# Patient Record
Sex: FEMALE | Race: WHITE | NOT HISPANIC OR LATINO
[De-identification: names, ages, dates, MRNs, and addresses within clinical notes are randomized per-mention and may not be internally consistent; named-entity substitution may affect disease eponyms.]

---

## 2018-07-16 ENCOUNTER — RESULT REVIEW (OUTPATIENT)
Age: 28
End: 2018-07-16

## 2019-06-18 ENCOUNTER — FORM ENCOUNTER (OUTPATIENT)
Age: 29
End: 2019-06-18

## 2019-06-19 ENCOUNTER — APPOINTMENT (OUTPATIENT)
Dept: RHEUMATOLOGY | Facility: CLINIC | Age: 29
End: 2019-06-19
Payer: COMMERCIAL

## 2019-06-19 VITALS
HEART RATE: 80 BPM | WEIGHT: 121 LBS | BODY MASS INDEX: 20.16 KG/M2 | OXYGEN SATURATION: 97 % | TEMPERATURE: 99.8 F | DIASTOLIC BLOOD PRESSURE: 88 MMHG | HEIGHT: 65 IN | SYSTOLIC BLOOD PRESSURE: 127 MMHG | RESPIRATION RATE: 16 BRPM

## 2019-06-19 DIAGNOSIS — Z83.3 FAMILY HISTORY OF DIABETES MELLITUS: ICD-10-CM

## 2019-06-19 DIAGNOSIS — Z83.49 FAMILY HISTORY OF OTHER ENDOCRINE, NUTRITIONAL AND METABOLIC DISEASES: ICD-10-CM

## 2019-06-19 DIAGNOSIS — M25.562 PAIN IN RIGHT KNEE: ICD-10-CM

## 2019-06-19 DIAGNOSIS — M21.41 FLAT FOOT [PES PLANUS] (ACQUIRED), RIGHT FOOT: ICD-10-CM

## 2019-06-19 DIAGNOSIS — M21.42 FLAT FOOT [PES PLANUS] (ACQUIRED), RIGHT FOOT: ICD-10-CM

## 2019-06-19 DIAGNOSIS — Z82.62 FAMILY HISTORY OF OSTEOPOROSIS: ICD-10-CM

## 2019-06-19 DIAGNOSIS — M25.561 PAIN IN RIGHT KNEE: ICD-10-CM

## 2019-06-19 DIAGNOSIS — Z87.09 PERSONAL HISTORY OF OTHER DISEASES OF THE RESPIRATORY SYSTEM: ICD-10-CM

## 2019-06-19 PROCEDURE — 99203 OFFICE O/P NEW LOW 30 MIN: CPT

## 2019-06-19 PROCEDURE — 73562 X-RAY EXAM OF KNEE 3: CPT | Mod: RT

## 2019-06-19 RX ORDER — NORETHINDRONE ACETATE AND ETHINYL ESTRADIOL 1; 20 MG/1; UG/1
1-20 TABLET ORAL DAILY
Refills: 0 | Status: ACTIVE | COMMUNITY
Start: 2019-06-19

## 2019-06-25 LAB
25(OH)D3 SERPL-MCNC: 35.1 NG/ML
ALBUMIN SERPL ELPH-MCNC: 4.6 G/DL
ALP BLD-CCNC: 46 U/L
ALT SERPL-CCNC: 13 U/L
ANA PAT FLD IF-IMP: ABNORMAL
ANA SER IF-ACNC: ABNORMAL
ANION GAP SERPL CALC-SCNC: 14 MMOL/L
APPEARANCE: CLEAR
AST SERPL-CCNC: 16 U/L
BACTERIA: NEGATIVE
BASOPHILS # BLD AUTO: 0.04 K/UL
BASOPHILS NFR BLD AUTO: 0.5 %
BILIRUB SERPL-MCNC: 0.5 MG/DL
BILIRUBIN URINE: NEGATIVE
BLOOD URINE: NEGATIVE
BUN SERPL-MCNC: 12 MG/DL
CA-I SERPL-SCNC: 1.2 MMOL/L
CALCIUM SERPL-MCNC: 9.5 MG/DL
CHLORIDE SERPL-SCNC: 103 MMOL/L
CO2 SERPL-SCNC: 22 MMOL/L
COLOR: NORMAL
CREAT SERPL-MCNC: 0.75 MG/DL
CREAT SPEC-SCNC: 47 MG/DL
CREAT/PROT UR: 0.1 RATIO
EOSINOPHIL # BLD AUTO: 0.03 K/UL
EOSINOPHIL NFR BLD AUTO: 0.3 %
GLUCOSE QUALITATIVE U: NEGATIVE
GLUCOSE SERPL-MCNC: 90 MG/DL
HCT VFR BLD CALC: 39.2 %
HGB BLD-MCNC: 13 G/DL
HYALINE CASTS: 0 /LPF
IMM GRANULOCYTES NFR BLD AUTO: 0.3 %
KETONES URINE: NEGATIVE
LEUKOCYTE ESTERASE URINE: NEGATIVE
LYMPHOCYTES # BLD AUTO: 3.52 K/UL
LYMPHOCYTES NFR BLD AUTO: 40.5 %
MAN DIFF?: NORMAL
MCHC RBC-ENTMCNC: 30.6 PG
MCHC RBC-ENTMCNC: 33.2 GM/DL
MCV RBC AUTO: 92.2 FL
MICROSCOPIC-UA: NORMAL
MONOCYTES # BLD AUTO: 0.74 K/UL
MONOCYTES NFR BLD AUTO: 8.5 %
NEUTROPHILS # BLD AUTO: 4.34 K/UL
NEUTROPHILS NFR BLD AUTO: 49.9 %
NITRITE URINE: NEGATIVE
PH URINE: 7.5
PHOSPHATE SERPL-MCNC: 3.3 MG/DL
PLATELET # BLD AUTO: 273 K/UL
POTASSIUM SERPL-SCNC: 4.2 MMOL/L
PROT SERPL-MCNC: 7.2 G/DL
PROT UR-MCNC: 4 MG/DL
PROTEIN URINE: NEGATIVE
RBC # BLD: 4.25 M/UL
RBC # FLD: 12.2 %
RED BLOOD CELLS URINE: 2 /HPF
SODIUM SERPL-SCNC: 139 MMOL/L
SPECIFIC GRAVITY URINE: 1.01
SQUAMOUS EPITHELIAL CELLS: 0 /HPF
T4 FREE SERPL-MCNC: 1.3 NG/DL
TSH SERPL-ACNC: 2.18 UIU/ML
UROBILINOGEN URINE: NORMAL
WBC # FLD AUTO: 8.7 K/UL
WHITE BLOOD CELLS URINE: 0 /HPF

## 2019-08-20 ENCOUNTER — RESULT REVIEW (OUTPATIENT)
Age: 29
End: 2019-08-20

## 2020-04-26 ENCOUNTER — MESSAGE (OUTPATIENT)
Age: 30
End: 2020-04-26

## 2020-10-20 ENCOUNTER — RESULT REVIEW (OUTPATIENT)
Age: 30
End: 2020-10-20

## 2022-03-29 ENCOUNTER — APPOINTMENT (OUTPATIENT)
Dept: OBGYN | Facility: CLINIC | Age: 32
End: 2022-03-29
Payer: COMMERCIAL

## 2022-03-29 ENCOUNTER — RESULT REVIEW (OUTPATIENT)
Age: 32
End: 2022-03-29

## 2022-03-29 PROCEDURE — 81002 URINALYSIS NONAUTO W/O SCOPE: CPT

## 2022-03-29 PROCEDURE — 76856 US EXAM PELVIC COMPLETE: CPT | Mod: 59

## 2022-03-29 PROCEDURE — 99000 SPECIMEN HANDLING OFFICE-LAB: CPT

## 2022-03-29 PROCEDURE — 76830 TRANSVAGINAL US NON-OB: CPT

## 2022-03-29 PROCEDURE — 99395 PREV VISIT EST AGE 18-39: CPT | Mod: 25

## 2022-08-16 ENCOUNTER — APPOINTMENT (OUTPATIENT)
Dept: ENDOCRINOLOGY | Facility: CLINIC | Age: 32
End: 2022-08-16

## 2022-08-16 ENCOUNTER — NON-APPOINTMENT (OUTPATIENT)
Age: 32
End: 2022-08-16

## 2022-08-16 ENCOUNTER — LABORATORY RESULT (OUTPATIENT)
Age: 32
End: 2022-08-16

## 2022-08-16 VITALS
DIASTOLIC BLOOD PRESSURE: 84 MMHG | HEIGHT: 65 IN | BODY MASS INDEX: 21.04 KG/M2 | HEART RATE: 78 BPM | SYSTOLIC BLOOD PRESSURE: 135 MMHG | WEIGHT: 126.25 LBS | OXYGEN SATURATION: 97 %

## 2022-08-16 DIAGNOSIS — Z82.5 FAMILY HISTORY OF ASTHMA AND OTHER CHRONIC LOWER RESPIRATORY DISEASES: ICD-10-CM

## 2022-08-16 DIAGNOSIS — R25.1 TREMOR, UNSPECIFIED: ICD-10-CM

## 2022-08-16 DIAGNOSIS — Z83.3 FAMILY HISTORY OF DIABETES MELLITUS: ICD-10-CM

## 2022-08-16 DIAGNOSIS — M17.12 UNILATERAL PRIMARY OSTEOARTHRITIS, LEFT KNEE: ICD-10-CM

## 2022-08-16 DIAGNOSIS — Z82.62 FAMILY HISTORY OF OSTEOPOROSIS: ICD-10-CM

## 2022-08-16 DIAGNOSIS — Z83.49 FAMILY HISTORY OF OTHER ENDOCRINE, NUTRITIONAL AND METABOLIC DISEASES: ICD-10-CM

## 2022-08-16 PROCEDURE — 36415 COLL VENOUS BLD VENIPUNCTURE: CPT

## 2022-08-16 PROCEDURE — 99205 OFFICE O/P NEW HI 60 MIN: CPT | Mod: 25

## 2022-08-16 RX ORDER — NORETHINDRONE ACETATE AND ETHINYL ESTRADIOL AND FERROUS FUMARATE 1MG-20(21)
1-20 KIT ORAL
Qty: 28 | Refills: 0 | Status: ACTIVE | COMMUNITY
Start: 2022-03-29

## 2022-08-16 NOTE — PAST MEDICAL HISTORY
[Menstruating] : The patient is menstruating [Menarche Age ____] : age at menarche was [unfilled] [Definite ___ (Date)] : the last menstrual period was [unfilled] [Regular Cycle Intervals] : have been regular [Total Preg ___] : G[unfilled]

## 2022-08-16 NOTE — CONSULT LETTER
[Dear  ___] : Dear  [unfilled], [( Thank you for referring [unfilled] for consultation for _____ )] : Thank you for referring [unfilled] for consultation for [unfilled] [Please see my note below.] : Please see my note below. [Consult Closing:] : Thank you very much for allowing me to participate in the care of this patient.  If you have any questions, please do not hesitate to contact me. [FreeTextEntry3] : Chelsea Pérez MD\par

## 2022-08-16 NOTE — REVIEW OF SYSTEMS
[Palpitations] : palpitations [Nausea] : nausea [Heartburn] : heartburn [Gas/Bloating] : gas/bloating [Polyuria] : polyuria [Nocturia] : nocturia [Joint Pain] : joint pain [Tremors] : tremors [Depression] : depression [Negative] : Heme/Lymph [All other systems negative] : All other systems negative [Recent Weight Gain (___ Lbs)] : no recent weight gain [Recent Weight Loss (___ Lbs)] : no recent weight loss [Insomnia] : no insomnia [FreeTextEntry3] : contacts  [FreeTextEntry6] : h/o asthma [FreeTextEntry9] : knee pain and swelling , some back pain  [de-identified] : no meds  [de-identified] : thyroid nodule 6-7 years

## 2022-08-16 NOTE — HISTORY OF PRESENT ILLNESS
[FreeTextEntry1] : Patient is a 30 y/o female Here for evaluation of an  enlarged thyroid. This was found at the age of 25 and remembers having a number of ultrasounds and a biopsy was negative. She does not have these records. Has not been followed up on for a number of years. She has no local neck discomfort. Her weight has been stable and menses  have been regular but is currently on birth control. She has noticed an increase in tremors and some difficulty falling asleep.  Has not had recent labs done. She did fracture her foot a number of years ago and a bone density showed osteopenia. Her mom does have osteoporosis end Hashimoto's.

## 2022-08-16 NOTE — REASON FOR VISIT
[Consultation] : a consultation visit [Thyroid nodule/ MNG] : thyroid nodule/ MNG [FreeTextEntry2] : Dr Gerry Villalobos

## 2022-08-16 NOTE — ASSESSMENT
[FreeTextEntry1] : Patient with a nodular goiter. Will check thyroid levels today clinically suspicious of hyperthyroidism. She will do thyroid ultrasound and try to retrieve her old records for comparison. She will try to obtain a copy of her bone density. Given that she was told she had a low bone density and a history of her fracture will rule out celiac since she does express GI symptoms.\par Call to discuss results and further plans.

## 2022-08-17 LAB
25(OH)D3 SERPL-MCNC: 34.6 NG/ML
ALBUMIN SERPL ELPH-MCNC: 4.6 G/DL
ALP BLD-CCNC: 49 U/L
ALT SERPL-CCNC: 14 U/L
ANION GAP SERPL CALC-SCNC: 11 MMOL/L
AST SERPL-CCNC: 18 U/L
BASOPHILS # BLD AUTO: 0.03 K/UL
BASOPHILS NFR BLD AUTO: 0.5 %
BILIRUB SERPL-MCNC: 0.5 MG/DL
BUN SERPL-MCNC: 11 MG/DL
CALCIUM SERPL-MCNC: 9.2 MG/DL
CHLORIDE SERPL-SCNC: 105 MMOL/L
CHOLEST SERPL-MCNC: 183 MG/DL
CO2 SERPL-SCNC: 23 MMOL/L
CREAT SERPL-MCNC: 0.7 MG/DL
EGFR: 119 ML/MIN/1.73M2
EOSINOPHIL # BLD AUTO: 0.07 K/UL
EOSINOPHIL NFR BLD AUTO: 1.1 %
GLUCOSE SERPL-MCNC: 90 MG/DL
HCT VFR BLD CALC: 41.6 %
HDLC SERPL-MCNC: 61 MG/DL
HGB BLD-MCNC: 13.5 G/DL
IMM GRANULOCYTES NFR BLD AUTO: 0.3 %
LDLC SERPL CALC-MCNC: 100 MG/DL
LYMPHOCYTES # BLD AUTO: 2.55 K/UL
LYMPHOCYTES NFR BLD AUTO: 40.7 %
MAN DIFF?: NORMAL
MCHC RBC-ENTMCNC: 30.9 PG
MCHC RBC-ENTMCNC: 32.5 GM/DL
MCV RBC AUTO: 95.2 FL
MONOCYTES # BLD AUTO: 0.45 K/UL
MONOCYTES NFR BLD AUTO: 7.2 %
NEUTROPHILS # BLD AUTO: 3.14 K/UL
NEUTROPHILS NFR BLD AUTO: 50.2 %
NONHDLC SERPL-MCNC: 122 MG/DL
PLATELET # BLD AUTO: 291 K/UL
POTASSIUM SERPL-SCNC: 4.3 MMOL/L
PROT SERPL-MCNC: 7.2 G/DL
RBC # BLD: 4.37 M/UL
RBC # FLD: 12.5 %
SODIUM SERPL-SCNC: 138 MMOL/L
T3 SERPL-MCNC: 126 NG/DL
T4 FREE SERPL-MCNC: 1.2 NG/DL
T4 SERPL-MCNC: 7.6 UG/DL
TRIGL SERPL-MCNC: 112 MG/DL
TSH SERPL-ACNC: 2.21 UIU/ML
WBC # FLD AUTO: 6.26 K/UL

## 2022-08-18 ENCOUNTER — APPOINTMENT (OUTPATIENT)
Dept: ULTRASOUND IMAGING | Facility: CLINIC | Age: 32
End: 2022-08-18

## 2022-08-18 ENCOUNTER — OUTPATIENT (OUTPATIENT)
Dept: OUTPATIENT SERVICES | Facility: HOSPITAL | Age: 32
LOS: 1 days | End: 2022-08-18
Payer: COMMERCIAL

## 2022-08-18 DIAGNOSIS — E04.1 NONTOXIC SINGLE THYROID NODULE: ICD-10-CM

## 2022-08-18 PROCEDURE — 76536 US EXAM OF HEAD AND NECK: CPT | Mod: 26

## 2022-08-18 PROCEDURE — 76536 US EXAM OF HEAD AND NECK: CPT

## 2022-08-19 ENCOUNTER — TRANSCRIPTION ENCOUNTER (OUTPATIENT)
Age: 32
End: 2022-08-19

## 2022-10-25 ENCOUNTER — APPOINTMENT (OUTPATIENT)
Dept: OBGYN | Facility: CLINIC | Age: 32
End: 2022-10-25

## 2022-10-25 PROCEDURE — 76830 TRANSVAGINAL US NON-OB: CPT

## 2022-10-25 PROCEDURE — 99213 OFFICE O/P EST LOW 20 MIN: CPT | Mod: 25

## 2022-11-02 LAB
CELIACPAN: NORMAL
THYROGLOB AB SERPL-ACNC: <20 IU/ML
THYROPEROXIDASE AB SERPL IA-ACNC: 175 IU/ML
TSH RECEPTOR AB: <1.1 IU/L
TSI ACT/NOR SER: <0.1 IU/L

## 2023-04-18 ENCOUNTER — APPOINTMENT (OUTPATIENT)
Dept: OBGYN | Facility: CLINIC | Age: 33
End: 2023-04-18
Payer: COMMERCIAL

## 2023-04-18 PROCEDURE — 99395 PREV VISIT EST AGE 18-39: CPT

## 2023-04-18 PROCEDURE — 81002 URINALYSIS NONAUTO W/O SCOPE: CPT

## 2023-08-03 ENCOUNTER — APPOINTMENT (OUTPATIENT)
Dept: INTERNAL MEDICINE | Facility: CLINIC | Age: 33
End: 2023-08-03

## 2023-08-08 ENCOUNTER — APPOINTMENT (OUTPATIENT)
Dept: INTERNAL MEDICINE | Facility: CLINIC | Age: 33
End: 2023-08-08

## 2023-09-20 ENCOUNTER — APPOINTMENT (OUTPATIENT)
Dept: INTERNAL MEDICINE | Facility: CLINIC | Age: 33
End: 2023-09-20

## 2023-09-20 ENCOUNTER — APPOINTMENT (OUTPATIENT)
Dept: DERMATOLOGY | Facility: CLINIC | Age: 33
End: 2023-09-20

## 2023-12-12 ENCOUNTER — APPOINTMENT (OUTPATIENT)
Dept: OBGYN | Facility: CLINIC | Age: 33
End: 2023-12-12
Payer: COMMERCIAL

## 2023-12-12 PROCEDURE — 99213 OFFICE O/P EST LOW 20 MIN: CPT

## 2023-12-12 PROCEDURE — 76830 TRANSVAGINAL US NON-OB: CPT

## 2024-02-13 ENCOUNTER — NON-APPOINTMENT (OUTPATIENT)
Age: 34
End: 2024-02-13

## 2024-02-14 ENCOUNTER — APPOINTMENT (OUTPATIENT)
Dept: DERMATOLOGY | Facility: CLINIC | Age: 34
End: 2024-02-14
Payer: COMMERCIAL

## 2024-02-14 ENCOUNTER — LABORATORY RESULT (OUTPATIENT)
Age: 34
End: 2024-02-14

## 2024-02-14 DIAGNOSIS — L50.3 DERMATOGRAPHIC URTICARIA: ICD-10-CM

## 2024-02-14 DIAGNOSIS — L30.9 DERMATITIS, UNSPECIFIED: ICD-10-CM

## 2024-02-14 PROCEDURE — 99203 OFFICE O/P NEW LOW 30 MIN: CPT

## 2024-02-14 RX ORDER — TRIAMCINOLONE ACETONIDE 1 MG/G
0.1 OINTMENT TOPICAL
Qty: 1 | Refills: 0 | Status: ACTIVE | COMMUNITY
Start: 2024-02-14 | End: 1900-01-01

## 2024-02-14 NOTE — PHYSICAL EXAM
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

## 2024-02-14 NOTE — ASSESSMENT
[FreeTextEntry1] : #favor arthropod assault, R forearm ok to complete the course of abx given surrounding zone of erythema will check lyme serology though EM less likely start triamcinolone 0.1% ointment BID to AA on body PRN pruritus, SED recommend having home evaluated for bed bugs  #dermatographism discussed nature and course of condition, including treatment options risks/benefits/alternatives involved start claritin 10 mg daily , SED including drowsiness  RTC 2 mo

## 2024-02-14 NOTE — HISTORY OF PRESENT ILLNESS
[FreeTextEntry1] : np bump [de-identified] : Referred by: none   Ms. CATRACHITO GARRISON  is a 33 year old F here for evaluation of below  #insect bites on R arm on 2/11, worsened w/ swelling the following 2 days . UC started amox 2 days ago w/ improvement has been happening a few times over the last few months, about once a week no other affected household contacts no travel  no hiking   #also itchy on chest over the last few weeks   no personal or family h/o skin cancer psychologist at [facility]

## 2024-03-22 ENCOUNTER — LABORATORY RESULT (OUTPATIENT)
Age: 34
End: 2024-03-22

## 2024-03-22 ENCOUNTER — NON-APPOINTMENT (OUTPATIENT)
Age: 34
End: 2024-03-22

## 2024-03-22 ENCOUNTER — APPOINTMENT (OUTPATIENT)
Dept: INTERNAL MEDICINE | Facility: CLINIC | Age: 34
End: 2024-03-22
Payer: COMMERCIAL

## 2024-03-22 VITALS
WEIGHT: 121 LBS | OXYGEN SATURATION: 99 % | SYSTOLIC BLOOD PRESSURE: 118 MMHG | HEART RATE: 94 BPM | TEMPERATURE: 97.7 F | DIASTOLIC BLOOD PRESSURE: 80 MMHG | RESPIRATION RATE: 18 BRPM | BODY MASS INDEX: 20.16 KG/M2 | HEIGHT: 65 IN

## 2024-03-22 DIAGNOSIS — Z13.31 ENCOUNTER FOR SCREENING FOR DEPRESSION: ICD-10-CM

## 2024-03-22 DIAGNOSIS — K21.9 GASTRO-ESOPHAGEAL REFLUX DISEASE W/OUT ESOPHAGITIS: ICD-10-CM

## 2024-03-22 DIAGNOSIS — K92.1 MELENA: ICD-10-CM

## 2024-03-22 DIAGNOSIS — Z00.00 ENCOUNTER FOR GENERAL ADULT MEDICAL EXAMINATION W/OUT ABNORMAL FINDINGS: ICD-10-CM

## 2024-03-22 DIAGNOSIS — Z13.39 ENCOUNTER FOR SCREENING EXAM FOR OTHER MENTAL HEALTH AND BEHAVIORAL DISORDERS: ICD-10-CM

## 2024-03-22 DIAGNOSIS — Z86.39 PERSONAL HISTORY OF OTHER ENDOCRINE, NUTRITIONAL AND METABOLIC DISEASE: ICD-10-CM

## 2024-03-22 DIAGNOSIS — I45.10 UNSPECIFIED RIGHT BUNDLE-BRANCH BLOCK: ICD-10-CM

## 2024-03-22 DIAGNOSIS — Z71.89 OTHER SPECIFIED COUNSELING: ICD-10-CM

## 2024-03-22 DIAGNOSIS — E55.9 VITAMIN D DEFICIENCY, UNSPECIFIED: ICD-10-CM

## 2024-03-22 PROCEDURE — 36415 COLL VENOUS BLD VENIPUNCTURE: CPT

## 2024-03-22 PROCEDURE — 99395 PREV VISIT EST AGE 18-39: CPT | Mod: 25

## 2024-03-22 PROCEDURE — 99204 OFFICE O/P NEW MOD 45 MIN: CPT | Mod: 25

## 2024-03-22 PROCEDURE — 93000 ELECTROCARDIOGRAM COMPLETE: CPT

## 2024-03-22 PROCEDURE — 99497 ADVNCD CARE PLAN 30 MIN: CPT

## 2024-03-23 LAB
25(OH)D3 SERPL-MCNC: 23.5 NG/ML
ALBUMIN SERPL ELPH-MCNC: 4.7 G/DL
ALP BLD-CCNC: 55 U/L
ALT SERPL-CCNC: 16 U/L
ANION GAP SERPL CALC-SCNC: 13 MMOL/L
APPEARANCE: CLEAR
AST SERPL-CCNC: 19 U/L
BASOPHILS # BLD AUTO: 0.03 K/UL
BASOPHILS NFR BLD AUTO: 0.4 %
BILIRUB SERPL-MCNC: 0.5 MG/DL
BILIRUBIN URINE: NEGATIVE
BLOOD URINE: NEGATIVE
BUN SERPL-MCNC: 9 MG/DL
CALCIUM SERPL-MCNC: 9.3 MG/DL
CHLORIDE SERPL-SCNC: 103 MMOL/L
CHOLEST SERPL-MCNC: 178 MG/DL
CO2 SERPL-SCNC: 22 MMOL/L
COLOR: YELLOW
CREAT SERPL-MCNC: 0.77 MG/DL
EGFR: 104 ML/MIN/1.73M2
EOSINOPHIL # BLD AUTO: 0.15 K/UL
EOSINOPHIL NFR BLD AUTO: 2.1 %
ESTIMATED AVERAGE GLUCOSE: 97 MG/DL
GGT SERPL-CCNC: 19 U/L
GLUCOSE QUALITATIVE U: NEGATIVE MG/DL
GLUCOSE SERPL-MCNC: 92 MG/DL
HBA1C MFR BLD HPLC: 5 %
HCT VFR BLD CALC: 42.7 %
HDLC SERPL-MCNC: 68 MG/DL
HGB BLD-MCNC: 14.3 G/DL
IMM GRANULOCYTES NFR BLD AUTO: 0.6 %
KETONES URINE: ABNORMAL MG/DL
LDLC SERPL CALC-MCNC: 96 MG/DL
LEUKOCYTE ESTERASE URINE: ABNORMAL
LYMPHOCYTES # BLD AUTO: 2.57 K/UL
LYMPHOCYTES NFR BLD AUTO: 35.7 %
MAN DIFF?: NORMAL
MCHC RBC-ENTMCNC: 31.2 PG
MCHC RBC-ENTMCNC: 33.5 GM/DL
MCV RBC AUTO: 93 FL
MONOCYTES # BLD AUTO: 0.57 K/UL
MONOCYTES NFR BLD AUTO: 7.9 %
NEUTROPHILS # BLD AUTO: 3.84 K/UL
NEUTROPHILS NFR BLD AUTO: 53.3 %
NITRITE URINE: NEGATIVE
NONHDLC SERPL-MCNC: 110 MG/DL
PH URINE: 6.5
PLATELET # BLD AUTO: 336 K/UL
POTASSIUM SERPL-SCNC: 3.9 MMOL/L
PROT SERPL-MCNC: 7.6 G/DL
PROTEIN URINE: NEGATIVE MG/DL
RBC # BLD: 4.59 M/UL
RBC # FLD: 12.4 %
SODIUM SERPL-SCNC: 137 MMOL/L
SPECIFIC GRAVITY URINE: 1.02
TRIGL SERPL-MCNC: 74 MG/DL
TSH SERPL-ACNC: 2.24 UIU/ML
UROBILINOGEN URINE: 1 MG/DL
WBC # FLD AUTO: 7.2 K/UL

## 2024-03-24 PROBLEM — Z00.00 PREVENTATIVE HEALTH CARE: Status: ACTIVE | Noted: 2024-03-24

## 2024-03-24 PROBLEM — K92.1 BLOOD IN STOOL: Status: ACTIVE | Noted: 2024-03-22

## 2024-03-24 PROBLEM — I45.10 INCOMPLETE RIGHT BUNDLE BRANCH BLOCK: Status: ACTIVE | Noted: 2024-03-24

## 2024-03-24 PROBLEM — K21.9 GERD (GASTROESOPHAGEAL REFLUX DISEASE): Status: ACTIVE | Noted: 2024-03-22

## 2024-03-24 PROBLEM — E55.9 VITAMIN D DEFICIENCY: Status: ACTIVE | Noted: 2022-08-16

## 2024-03-24 LAB
T3FREE SERPL-MCNC: 3.44 PG/ML
T4 FREE SERPL-MCNC: 1.3 NG/DL
T4 SERPL-MCNC: 9.3 UG/DL
TSH SERPL-ACNC: 2.24 UIU/ML

## 2024-03-24 NOTE — REASON FOR VISIT
[Initial Visit] : an initial visit [FreeTextEntry1] : Initial/annual wellness for this 33-year-old female

## 2024-03-24 NOTE — COUNSELING
[None] : None [Good understanding] : Patient has a good understanding of lifestyle changes and steps needed to achieve self management goal [Fall prevention counseling provided] : Fall prevention counseling provided [Adequate lighting] : Adequate lighting [No throw rugs] : No throw rugs [Use proper foot wear] : Use proper foot wear [Use recommended devices] : Use recommended devices [Sleep ___ hours/day] : Sleep [unfilled] hours/day [Behavioral health counseling provided] : Behavioral health counseling provided [Plan in advance] : Plan in advance [Engage in a relaxing activity] : Engage in a relaxing activity [de-identified] : Total face-to-face time with patient - 20 minutes; >50% involved counselling, review of labs/tests, and/or coordination of medical care: Medical Annual wellness visit completed: HRA completed and reviewed with patient Medical, family, surgical history reviewed with patient and updated List of current providers r/w patient and updated Vitals, BMI reviewed and discussed along with healthy BMI goals. Dietary counseling x 15 minutes provided Depression PHQ 9 completed and reviewed  Annual safety assessment reviewed discussed advanced directives smoking cessation counseling provided Established routine screening and immunization schedules  Symptomatic patients : Test for influenza, if positive, treat for influenza and do not continue below.  1. Fever plus cough or shortness of breath : Test for RVP and COVID-19. 2.Indirect, circumstantial or unclear exposure to COVID-19, or other concerning cases not meeting above criteria: Please call [facility] to discuss testing.  +++ All above cases must be reported to the [facility] registry. +++  Asymptomatic patients:  1. Known first-degree direct-contact exposure to positive COVID-19 patient but asymptomatic: No testing PLUS 14 day self-quarantine. Pt to call if symptoms develop. Report to [facility] Registry. 2. No known exposure and asymptomatic, referred from outside healthcare organization: Please call [facility] to discuss testing.  3.All other asymptomatic patients with no known exposures: no testing, no exceptions.  diet and exercise weight loss.  Low-salt low-fat ADA diet/ htn- Discussed diabetes physiology - Discussed importance of monitoring blood glucose levels - Encouraged a low fat/low cholesterol diet - Discussed symptoms of hyperglycemia and hypoglycemia - Discussed ADA glucose goals - Discussed  HGB A1c and the effects of blood glucose on the level - Discussed Healthy eating, avoidance of concentrated sweets, and to include vegetables by at least 2 meals a day - Discussed regular exercise - Discussed importance of follow up physician visits Limit intake of Sodium (Salt) to less than 2 grams a day to prevent fluid retention-swelling or worsening of symptoms. The importance of keeping the blood pressure at or below 130/80 to prevent stroke, heart attacks, kidney failure, blindness, and loss of limbs was  low chol diet. Avoid fried foods, red meat, butter, eggs, hard cheeses. Use canola or olive oil preferred. ::  was established in which goals would be set, monitoring would be done, and problem solving would also be addressed. The patient would be assisted using behavior change techniques, such as self-help and counseling through behavioral modification: Problem solving using hypnosis and positive medical reinforcement to achieve agreed-upon goals.

## 2024-03-24 NOTE — PHYSICAL EXAM
[No Acute Distress] : no acute distress [Well Developed] : well developed [Well Nourished] : well nourished [Well-Appearing] : well-appearing [Normal Sclera/Conjunctiva] : normal sclera/conjunctiva [PERRL] : pupils equal round and reactive to light [Normal Outer Ear/Nose] : the outer ears and nose were normal in appearance [EOMI] : extraocular movements intact [No JVD] : no jugular venous distention [Normal Oropharynx] : the oropharynx was normal [No Lymphadenopathy] : no lymphadenopathy [Supple] : supple [Thyroid Normal, No Nodules] : the thyroid was normal and there were no nodules present [No Respiratory Distress] : no respiratory distress  [No Accessory Muscle Use] : no accessory muscle use [Clear to Auscultation] : lungs were clear to auscultation bilaterally [Regular Rhythm] : with a regular rhythm [Normal Rate] : normal rate  [No Murmur] : no murmur heard [Normal S1, S2] : normal S1 and S2 [No Carotid Bruits] : no carotid bruits [No Abdominal Bruit] : a ~M bruit was not heard ~T in the abdomen [Pedal Pulses Present] : the pedal pulses are present [No Varicosities] : no varicosities [No Edema] : there was no peripheral edema [No Palpable Aorta] : no palpable aorta [No Extremity Clubbing/Cyanosis] : no extremity clubbing/cyanosis [Soft] : abdomen soft [Non-distended] : non-distended [Non Tender] : non-tender [No HSM] : no HSM [No Masses] : no abdominal mass palpated [Normal Posterior Cervical Nodes] : no posterior cervical lymphadenopathy [Normal Bowel Sounds] : normal bowel sounds [No CVA Tenderness] : no CVA  tenderness [Normal Anterior Cervical Nodes] : no anterior cervical lymphadenopathy [No Spinal Tenderness] : no spinal tenderness [No Joint Swelling] : no joint swelling [Grossly Normal Strength/Tone] : grossly normal strength/tone [No Rash] : no rash [No Focal Deficits] : no focal deficits [Coordination Grossly Intact] : coordination grossly intact [Normal Affect] : the affect was normal [Deep Tendon Reflexes (DTR)] : deep tendon reflexes were 2+ and symmetric [Normal Gait] : normal gait [Normal Insight/Judgement] : insight and judgment were intact [Declined Rectal Exam] : declined rectal exam [Normal] : affect was normal and insight and judgment were intact [de-identified] : No flank pain [de-identified] : BMI 20 [de-identified] : Mild tenderness to deep palpation epigastrium and left lower quadrant

## 2024-03-24 NOTE — HISTORY OF PRESENT ILLNESS
[de-identified] : 32 y/o female patient present today for initial visit.  cc: GI issues have gotten bad over the past 2 months. Has constipation, cramping, nausea, blood in stool 3 or 4 times in the past 2 weeks.  Would want an US referral to check thyroid nodule. Hx: thyroid nodule  Voices no further complaints ROS as documented below. Denies fever, cough, chills, body aches, SOB.   I Radha Hooper, am scribing for and in the presence of Dr. Pike, the following sections of:  HISTORY OF PRESENT ILLNESS, PAST MEDICAL/FAMILY/SOCIAL HISTORY, ROS, VITALS, PE, DISPOSITION  [FreeTextEntry1] : initial visit-patient has been having abdominal discomfort with constipation some nausea and noticed blood in stools.  Patient denies fever chills cough chest pain or shortness of breath.  Patient has a history of a thyroid nodule.

## 2024-03-24 NOTE — REVIEW OF SYSTEMS
[Negative] : Heme/Lymph [Abdominal Pain] : abdominal pain [Nausea] : nausea [Constipation] : constipation [FreeTextEntry7] : blood in stool 3 or 4 times in the past 2 weeks ...  Noticed blood in stool

## 2024-03-24 NOTE — HEALTH RISK ASSESSMENT
[No] : In the past 12 months have you used drugs other than those required for medical reasons? No [Good] : ~his/her~  mood as  good [PHQ-2 Negative - No further assessment needed] : PHQ-2 Negative - No further assessment needed [I have developed a follow-up plan documented below in the note.] : I have developed a follow-up plan documented below in the note. [None] : None [Feels Safe at Home] : Feels safe at home [Sexually Active] : sexually active [Reports normal functional visual acuity (ie: able to read med bottle)] : Reports normal functional visual acuity [Safety elements used in home] : safety elements used in home [Carbon Monoxide Detector] : carbon monoxide detector [Sunscreen] : uses sunscreen [Seat Belt] :  uses seat belt [With Patient/Caregiver] : , with patient/caregiver [Time Spent: ___ minutes] : Time Spent: [unfilled] minutes [I will adhere to the patient's wishes.] : I will adhere to the patient's wishes. [Yes] : Yes [Monthly or less (1 pt)] : Monthly or less (1 point) [No falls in past year] : Patient reported no falls in the past year [1 or 2 (0 pts)] : 1 or 2 (0 points) [0] : 2) Feeling down, depressed, or hopeless: Not at all (0) [Patient reported PAP Smear was normal] : Patient reported PAP Smear was normal [Employed] : employed [With Family] : lives with family [Fully functional (bathing, dressing, toileting, transferring, walking, feeding)] : Fully functional (bathing, dressing, toileting, transferring, walking, feeding) [Fully functional (using the telephone, shopping, preparing meals, housekeeping, doing laundry, using] : Fully functional and needs no help or supervision to perform IADLs (using the telephone, shopping, preparing meals, housekeeping, doing laundry, using transportation, managing medications and managing finances) [Designated Healthcare Proxy] : Designated healthcare proxy [Name: ___] : Health Care Proxy's Name: [unfilled]  [Never] : Never [Aggressive treatment] : aggressive treatment [de-identified] : Gastro Endo [Audit-CScore] : 1 [de-identified] : Standard [de-identified] : Minimal [Language] : denies difficulty with language [Change in mental status noted] : No change in mental status noted [Learning/Retaining New Information] : denies difficulty learning/retaining new information [Handling Complex Tasks] : denies difficulty handling complex tasks [Behavior] : denies difficulty with behavior [Spatial Ability and Orientation] : denies difficulty with spatial ability and orientation [Reasoning] : denies difficulty with reasoning [Reports changes in hearing] : Reports no changes in hearing [Reports changes in dental health] : Reports no changes in dental health [Smoke Detector] : no smoke detector [Reports changes in vision] : Reports no changes in vision [Travel to Developing Areas] : does not  travel to developing areas [Guns at Home] : no guns at home [TB Exposure] : is not being exposed to tuberculosis [PapSmearDate] : 2023 [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

## 2024-03-25 DIAGNOSIS — R94.31 ABNORMAL ELECTROCARDIOGRAM [ECG] [EKG]: ICD-10-CM

## 2024-03-27 LAB — ANA SER IF-ACNC: NEGATIVE

## 2024-04-04 ENCOUNTER — APPOINTMENT (OUTPATIENT)
Dept: ULTRASOUND IMAGING | Facility: CLINIC | Age: 34
End: 2024-04-04

## 2024-04-09 ENCOUNTER — APPOINTMENT (OUTPATIENT)
Dept: ULTRASOUND IMAGING | Facility: CLINIC | Age: 34
End: 2024-04-09
Payer: COMMERCIAL

## 2024-04-09 ENCOUNTER — OUTPATIENT (OUTPATIENT)
Dept: OUTPATIENT SERVICES | Facility: HOSPITAL | Age: 34
LOS: 1 days | End: 2024-04-09
Payer: COMMERCIAL

## 2024-04-09 DIAGNOSIS — E04.1 NONTOXIC SINGLE THYROID NODULE: ICD-10-CM

## 2024-04-09 PROCEDURE — 76536 US EXAM OF HEAD AND NECK: CPT

## 2024-04-09 PROCEDURE — 76536 US EXAM OF HEAD AND NECK: CPT | Mod: 26

## 2024-04-09 PROCEDURE — 76700 US EXAM ABDOM COMPLETE: CPT | Mod: 26

## 2024-04-09 PROCEDURE — 76700 US EXAM ABDOM COMPLETE: CPT

## 2024-04-15 ENCOUNTER — RX CHANGE (OUTPATIENT)
Age: 34
End: 2024-04-15

## 2024-04-15 RX ORDER — OMEPRAZOLE 40 MG/1
40 CAPSULE, DELAYED RELEASE ORAL
Qty: 30 | Refills: 1 | Status: DISCONTINUED | COMMUNITY
Start: 2024-03-22 | End: 2024-04-15

## 2024-04-15 RX ORDER — OMEPRAZOLE 40 MG/1
40 CAPSULE, DELAYED RELEASE ORAL
Qty: 90 | Refills: 1 | Status: ACTIVE | COMMUNITY
Start: 1900-01-01 | End: 1900-01-01

## 2024-06-03 ENCOUNTER — APPOINTMENT (OUTPATIENT)
Dept: ENDOCRINOLOGY | Facility: CLINIC | Age: 34
End: 2024-06-03
Payer: COMMERCIAL

## 2024-06-03 VITALS
DIASTOLIC BLOOD PRESSURE: 68 MMHG | HEART RATE: 95 BPM | BODY MASS INDEX: 20.91 KG/M2 | WEIGHT: 125.5 LBS | OXYGEN SATURATION: 96 % | HEIGHT: 65 IN | SYSTOLIC BLOOD PRESSURE: 134 MMHG

## 2024-06-03 DIAGNOSIS — R76.8 OTHER SPECIFIED ABNORMAL IMMUNOLOGICAL FINDINGS IN SERUM: ICD-10-CM

## 2024-06-03 DIAGNOSIS — E04.1 NONTOXIC SINGLE THYROID NODULE: ICD-10-CM

## 2024-06-03 PROCEDURE — 99214 OFFICE O/P EST MOD 30 MIN: CPT

## 2024-06-03 NOTE — HISTORY OF PRESENT ILLNESS
[FreeTextEntry1] : Patient is a 34 y/o female Here for evaluation of an  enlarged thyroid. This was found at the age of 25 and remembers having a number of ultrasounds and a biopsy was negative. She does not have these records. Has not been followed up on for a number of years. She has no local neck discomfort. Her weight has been stable and menses  have been regular but is currently on birth control. She has noticed an increase in tremors and some difficulty falling asleep.  Has not had recent labs done. She did fracture her foot a number of years ago and a bone density showed osteopenia. Her mom does have osteoporosis end Hashimoto's.  Her labs did show positive TPO.

## 2024-06-03 NOTE — PHYSICAL EXAM
[Alert] : alert [Well Nourished] : well nourished [No Acute Distress] : no acute distress [Well Developed] : well developed [Normal Sclera/Conjunctiva] : normal sclera/conjunctiva [EOMI] : extra ocular movement intact [No Proptosis] : no proptosis [No Lid Lag] : no lid lag [Normal Oropharynx] : the oropharynx was normal [No Respiratory Distress] : no respiratory distress [No Accessory Muscle Use] : no accessory muscle use [Clear to Auscultation] : lungs were clear to auscultation bilaterally [Normal S1, S2] : normal S1 and S2 [Normal Rate] : heart rate was normal [Regular Rhythm] : with a regular rhythm [No Edema] : no peripheral edema [Pedal Pulses Normal] : the pedal pulses are present [Normal Bowel Sounds] : normal bowel sounds [Not Tender] : non-tender [Not Distended] : not distended [Soft] : abdomen soft [Normal Anterior Cervical Nodes] : no anterior cervical lymphadenopathy [No Spinal Tenderness] : no spinal tenderness [Spine Straight] : spine straight [No Stigmata of Cushings Syndrome] : no stigmata of Cushings Syndrome [Normal Gait] : normal gait [Normal Strength/Tone] : muscle strength and tone were normal [No Rash] : no rash [Normal Reflexes] : deep tendon reflexes were 2+ and symmetric [Oriented x3] : oriented to person, place, and time [Acanthosis Nigricans] : no acanthosis nigricans [de-identified] : Thyroid visually and palpably enlarged right greater than left approximately 60 g. right 1.5 cm nodule appreciated non tender  [de-identified] : + tremor

## 2024-06-03 NOTE — ASSESSMENT
[FreeTextEntry1] : Patient with a nodular goiter.  Recent sonogram was stable and thyroid function was normal.  Thyroid peroxidase antibodies done at the allergist was 66 which is lower than it was 2 years ago.  She has developed itching of her skin and some rashes so saw her dermatologist and that is why she is also seeing the allergist.  She will be seeing him again tomorrow to go over recent labs.  Will follow-up with me in 6 months.

## 2024-06-10 ENCOUNTER — APPOINTMENT (OUTPATIENT)
Dept: OBGYN | Facility: CLINIC | Age: 34
End: 2024-06-10
Payer: COMMERCIAL

## 2024-06-10 PROCEDURE — 81002 URINALYSIS NONAUTO W/O SCOPE: CPT

## 2024-06-10 PROCEDURE — 76830 TRANSVAGINAL US NON-OB: CPT

## 2024-06-10 PROCEDURE — 99395 PREV VISIT EST AGE 18-39: CPT

## 2024-06-10 PROCEDURE — 99459 PELVIC EXAMINATION: CPT

## 2024-06-18 ENCOUNTER — APPOINTMENT (OUTPATIENT)
Dept: DERMATOLOGY | Facility: CLINIC | Age: 34
End: 2024-06-18

## 2024-12-03 ENCOUNTER — APPOINTMENT (OUTPATIENT)
Dept: ENDOCRINOLOGY | Facility: CLINIC | Age: 34
End: 2024-12-03

## 2024-12-26 ENCOUNTER — APPOINTMENT (OUTPATIENT)
Dept: OBGYN | Facility: CLINIC | Age: 34
End: 2024-12-26
Payer: COMMERCIAL

## 2024-12-26 PROCEDURE — 76830 TRANSVAGINAL US NON-OB: CPT

## 2024-12-26 PROCEDURE — 99213 OFFICE O/P EST LOW 20 MIN: CPT

## 2025-06-06 ENCOUNTER — NON-APPOINTMENT (OUTPATIENT)
Age: 35
End: 2025-06-06

## 2025-06-06 ENCOUNTER — APPOINTMENT (OUTPATIENT)
Dept: INTERNAL MEDICINE | Facility: CLINIC | Age: 35
End: 2025-06-06

## 2025-06-06 VITALS
HEART RATE: 79 BPM | SYSTOLIC BLOOD PRESSURE: 122 MMHG | DIASTOLIC BLOOD PRESSURE: 80 MMHG | HEIGHT: 65 IN | BODY MASS INDEX: 21.66 KG/M2 | WEIGHT: 130 LBS | OXYGEN SATURATION: 98 % | TEMPERATURE: 98.8 F | RESPIRATION RATE: 18 BRPM

## 2025-06-06 PROCEDURE — 99215 OFFICE O/P EST HI 40 MIN: CPT | Mod: 25

## 2025-06-06 PROCEDURE — 36415 COLL VENOUS BLD VENIPUNCTURE: CPT

## 2025-06-06 PROCEDURE — G0444 DEPRESSION SCREEN ANNUAL: CPT | Mod: 59

## 2025-06-06 PROCEDURE — 99395 PREV VISIT EST AGE 18-39: CPT

## 2025-06-06 PROCEDURE — 99497 ADVNCD CARE PLAN 30 MIN: CPT

## 2025-06-08 LAB
25(OH)D3 SERPL-MCNC: 36 NG/ML
ALBUMIN SERPL ELPH-MCNC: 4.3 G/DL
ALP BLD-CCNC: 46 U/L
ALT SERPL-CCNC: 12 U/L
ANION GAP SERPL CALC-SCNC: 12 MMOL/L
APPEARANCE: CLEAR
AST SERPL-CCNC: 19 U/L
BASOPHILS # BLD AUTO: 0.03 K/UL
BASOPHILS NFR BLD AUTO: 0.5 %
BILIRUB SERPL-MCNC: 0.3 MG/DL
BILIRUBIN URINE: NEGATIVE
BLOOD URINE: NEGATIVE
BUN SERPL-MCNC: 17 MG/DL
CALCIUM SERPL-MCNC: 9.6 MG/DL
CCP AB SER IA-ACNC: <8 U/ML
CHLORIDE SERPL-SCNC: 106 MMOL/L
CHOLEST SERPL-MCNC: 180 MG/DL
CO2 SERPL-SCNC: 22 MMOL/L
COLOR: YELLOW
CREAT SERPL-MCNC: 0.71 MG/DL
CRP SERPL-MCNC: <3 MG/L
DSDNA AB SER-ACNC: 1 IU/ML
EGFRCR SERPLBLD CKD-EPI 2021: 114 ML/MIN/1.73M2
EOSINOPHIL # BLD AUTO: 0.06 K/UL
EOSINOPHIL NFR BLD AUTO: 1 %
ERYTHROCYTE [SEDIMENTATION RATE] IN BLOOD BY WESTERGREN METHOD: 10 MM/HR
ESTIMATED AVERAGE GLUCOSE: 103 MG/DL
GGT SERPL-CCNC: 15 U/L
GLUCOSE QUALITATIVE U: NEGATIVE MG/DL
GLUCOSE SERPL-MCNC: 94 MG/DL
HBA1C MFR BLD HPLC: 5.2 %
HCT VFR BLD CALC: 40 %
HDLC SERPL-MCNC: 61 MG/DL
HGB BLD-MCNC: 12.6 G/DL
IMM GRANULOCYTES NFR BLD AUTO: 0.5 %
KETONES URINE: NEGATIVE MG/DL
LDLC SERPL-MCNC: 105 MG/DL
LEUKOCYTE ESTERASE URINE: NEGATIVE
LYMPHOCYTES # BLD AUTO: 2.43 K/UL
LYMPHOCYTES NFR BLD AUTO: 41.4 %
MAN DIFF?: NORMAL
MCHC RBC-ENTMCNC: 31 PG
MCHC RBC-ENTMCNC: 31.5 G/DL
MCV RBC AUTO: 98.5 FL
MONOCYTES # BLD AUTO: 0.5 K/UL
MONOCYTES NFR BLD AUTO: 8.5 %
NEUTROPHILS # BLD AUTO: 2.82 K/UL
NEUTROPHILS NFR BLD AUTO: 48.1 %
NITRITE URINE: NEGATIVE
NONHDLC SERPL-MCNC: 119 MG/DL
PH URINE: 8
PLATELET # BLD AUTO: 301 K/UL
POTASSIUM SERPL-SCNC: 4.4 MMOL/L
PROT SERPL-MCNC: 7.4 G/DL
PROTEIN URINE: NEGATIVE MG/DL
RBC # BLD: 4.06 M/UL
RBC # FLD: 12.9 %
RF+CCP IGG SER-IMP: NEGATIVE
RHEUMATOID FACT SER QL: <10 IU/ML
SODIUM SERPL-SCNC: 139 MMOL/L
SPECIFIC GRAVITY URINE: 1.02
T3FREE SERPL-MCNC: 3.34 PG/ML
T4 FREE SERPL-MCNC: 1.1 NG/DL
T4 SERPL-MCNC: 8.2 UG/DL
THYROGLOB AB SERPL-ACNC: 51.5 IU/ML
THYROPEROXIDASE AB SERPL IA-ACNC: 65 IU/ML
TRIGL SERPL-MCNC: 75 MG/DL
TSH RECEPTOR AB: <1.1 IU/L
TSH SERPL-ACNC: 1.73 UIU/ML
UROBILINOGEN URINE: 0.2 MG/DL
WBC # FLD AUTO: 5.87 K/UL

## 2025-06-09 LAB
ANA PAT FLD IF-IMP: ABNORMAL
ANA SER IF-ACNC: ABNORMAL

## 2025-07-08 ENCOUNTER — APPOINTMENT (OUTPATIENT)
Dept: DERMATOLOGY | Facility: CLINIC | Age: 35
End: 2025-07-08

## 2025-07-22 ENCOUNTER — APPOINTMENT (OUTPATIENT)
Dept: DERMATOLOGY | Facility: CLINIC | Age: 35
End: 2025-07-22

## 2025-07-31 ENCOUNTER — APPOINTMENT (OUTPATIENT)
Dept: OBGYN | Facility: CLINIC | Age: 35
End: 2025-07-31
Payer: COMMERCIAL

## 2025-07-31 PROCEDURE — 81002 URINALYSIS NONAUTO W/O SCOPE: CPT

## 2025-07-31 PROCEDURE — 99395 PREV VISIT EST AGE 18-39: CPT

## 2025-07-31 PROCEDURE — 99459 PELVIC EXAMINATION: CPT

## 2025-07-31 PROCEDURE — 76830 TRANSVAGINAL US NON-OB: CPT

## 2025-09-11 ENCOUNTER — APPOINTMENT (OUTPATIENT)
Dept: ORTHOPEDIC SURGERY | Facility: CLINIC | Age: 35
End: 2025-09-11
Payer: COMMERCIAL

## 2025-09-11 VITALS — HEIGHT: 65 IN | BODY MASS INDEX: 21.66 KG/M2 | WEIGHT: 130 LBS

## 2025-09-11 DIAGNOSIS — M79.18 MYALGIA, OTHER SITE: ICD-10-CM

## 2025-09-11 DIAGNOSIS — M76.31 ILIOTIBIAL BAND SYNDROME, RIGHT LEG: ICD-10-CM

## 2025-09-11 DIAGNOSIS — M65.969 UNSP SYNOVITIS AND TENOSYNOVITIS, UNSPECIFIED LOWER LEG: ICD-10-CM

## 2025-09-11 DIAGNOSIS — M22.2X2 PATELLOFEMORAL DISORDERS, RIGHT KNEE: ICD-10-CM

## 2025-09-11 DIAGNOSIS — M22.2X1 PATELLOFEMORAL DISORDERS, RIGHT KNEE: ICD-10-CM

## 2025-09-11 PROCEDURE — 99204 OFFICE O/P NEW MOD 45 MIN: CPT

## 2025-09-11 PROCEDURE — 73564 X-RAY EXAM KNEE 4 OR MORE: CPT | Mod: 50

## 2025-09-11 RX ORDER — NAPROXEN 500 MG/1
500 TABLET ORAL TWICE DAILY
Qty: 30 | Refills: 0 | Status: ACTIVE | COMMUNITY
Start: 2025-09-11 | End: 1900-01-01

## 2025-09-16 ENCOUNTER — APPOINTMENT (OUTPATIENT)
Dept: OBGYN | Facility: CLINIC | Age: 35
End: 2025-09-16
Payer: COMMERCIAL

## 2025-09-16 PROCEDURE — 57454 BX/CURETT OF CERVIX W/SCOPE: CPT

## 2025-09-16 PROCEDURE — 81025 URINE PREGNANCY TEST: CPT

## 2025-09-18 ENCOUNTER — APPOINTMENT (OUTPATIENT)
Dept: CARDIOLOGY | Facility: CLINIC | Age: 35
End: 2025-09-18
Payer: COMMERCIAL

## 2025-09-18 VITALS
OXYGEN SATURATION: 98 % | HEIGHT: 65 IN | HEART RATE: 100 BPM | DIASTOLIC BLOOD PRESSURE: 70 MMHG | WEIGHT: 130 LBS | BODY MASS INDEX: 21.66 KG/M2 | SYSTOLIC BLOOD PRESSURE: 120 MMHG

## 2025-09-18 VITALS — HEART RATE: 88 BPM

## 2025-09-18 DIAGNOSIS — E78.5 HYPERLIPIDEMIA, UNSPECIFIED: ICD-10-CM

## 2025-09-18 DIAGNOSIS — R94.31 ABNORMAL ELECTROCARDIOGRAM [ECG] [EKG]: ICD-10-CM

## 2025-09-18 PROCEDURE — 99203 OFFICE O/P NEW LOW 30 MIN: CPT
